# Patient Record
(demographics unavailable — no encounter records)

---

## 2025-06-02 NOTE — HISTORY OF PRESENT ILLNESS
[FreeTextEntry1] : Patient is a 49 y/o  presenting for consultation for uterine myomas.  LMP 2025 - menses twice annually - heavy. Presently she is complaining of growing myoma with slight pressure symptoms. Today she presents for further evaluation and possible intervention. She is currently sexually active. Pt has been on HRT for the past 4 weeks and states that her perimenopausal symptoms have improved.   OBhx: 2008- C/S Female 8# 10/2012- C/S Male 8# [Mammogramdate] : 11/2024 [PapSmeardate] : 12/2024

## 2025-06-02 NOTE — PHYSICAL EXAM
[Chaperoned Physical Exam] : A chaperone was present in the examining room during all aspects of the physical examination. [Appropriately responsive] : appropriately responsive [Alert] : alert [No Acute Distress] : no acute distress [Soft] : soft [Non-tender] : non-tender [Non-distended] : non-distended [No HSM] : No HSM [No Lesions] : no lesions [No Mass] : no mass [Oriented x3] : oriented x3 [FreeTextEntry2] : Paul [FreeTextEntry1] : Medical Scribe [FreeTextEntry7] : large fibroid uterus, right fundal, mobile

## 2025-06-02 NOTE — DISCUSSION/SUMMARY
[FreeTextEntry1] :  51y/o F with uterine myomas  -Pelvic sonogram obtained and reviewed showing large right fundal myoma 11.5cm - Given patient pressure symptoms, heavy menses and age: [place] and hysterectomy discussed with patient. Risks and benefits of each option including potential effects on bleeding and pain reviewed -Referral given referral for MRI pelvis for surgical planning UFE vs hysterectomy -Depression screening PHQ2 - 0 -f/u after MRI via Telehealth to review imaging and plan of care

## 2025-06-02 NOTE — HISTORY OF PRESENT ILLNESS
[FreeTextEntry1] : Patient is a 51 y/o  presenting for consultation for uterine myomas.  LMP 2025 - menses twice annually - heavy. Presently she is complaining of growing myoma with slight pressure symptoms. Today she presents for further evaluation and possible intervention. She is currently sexually active. Pt has been on HRT for the past 4 weeks and states that her perimenopausal symptoms have improved.   OBhx: 2008- C/S Female 8# 10/2012- C/S Male 8# [Mammogramdate] : 11/2024 [PapSmeardate] : 12/2024

## 2025-06-02 NOTE — PROCEDURE
[Fibroid Uterus] : fibroid uterus [FreeTextEntry3] : Uterus=12cm with large right fundal 11.5cm myoma

## 2025-06-02 NOTE — DISCUSSION/SUMMARY
[FreeTextEntry1] :  49y/o F with uterine myomas  -Pelvic sonogram obtained and reviewed showing large right fundal myoma 11.5cm - Given patient pressure symptoms, heavy menses and age: [place] and hysterectomy discussed with patient. Risks and benefits of each option including potential effects on bleeding and pain reviewed -Referral given referral for MRI pelvis for surgical planning UFE vs hysterectomy -Depression screening PHQ2 - 0 -f/u after MRI via Telehealth to review imaging and plan of care